# Patient Record
Sex: FEMALE | Race: BLACK OR AFRICAN AMERICAN | NOT HISPANIC OR LATINO | Employment: UNEMPLOYED | URBAN - METROPOLITAN AREA
[De-identification: names, ages, dates, MRNs, and addresses within clinical notes are randomized per-mention and may not be internally consistent; named-entity substitution may affect disease eponyms.]

---

## 2022-12-15 ENCOUNTER — HOSPITAL ENCOUNTER (EMERGENCY)
Facility: HOSPITAL | Age: 32
Discharge: HOME | End: 2022-12-16
Attending: EMERGENCY MEDICINE

## 2022-12-15 VITALS
WEIGHT: 207 LBS | RESPIRATION RATE: 18 BRPM | HEART RATE: 67 BPM | OXYGEN SATURATION: 100 % | DIASTOLIC BLOOD PRESSURE: 65 MMHG | BODY MASS INDEX: 32.49 KG/M2 | HEIGHT: 67 IN | TEMPERATURE: 97 F | SYSTOLIC BLOOD PRESSURE: 118 MMHG

## 2022-12-15 DIAGNOSIS — G43.909 MIGRAINE: Primary | ICD-10-CM

## 2022-12-15 RX ORDER — KETOROLAC TROMETHAMINE 30 MG/ML
30 INJECTION, SOLUTION INTRAMUSCULAR; INTRAVENOUS ONCE
Status: DISCONTINUED | OUTPATIENT
Start: 2022-12-16 | End: 2022-12-16

## 2022-12-15 RX ORDER — METOCLOPRAMIDE HYDROCHLORIDE 5 MG/ML
10 INJECTION INTRAMUSCULAR; INTRAVENOUS ONCE
Status: DISCONTINUED | OUTPATIENT
Start: 2022-12-16 | End: 2022-12-16

## 2022-12-15 RX ORDER — MAGNESIUM SULFATE HEPTAHYDRATE 40 MG/ML
2 INJECTION, SOLUTION INTRAVENOUS ONCE
Status: DISCONTINUED | OUTPATIENT
Start: 2022-12-16 | End: 2022-12-16

## 2022-12-15 RX ORDER — DIPHENHYDRAMINE HYDROCHLORIDE 50 MG/ML
25 INJECTION INTRAMUSCULAR; INTRAVENOUS ONCE
Status: DISCONTINUED | OUTPATIENT
Start: 2022-12-16 | End: 2022-12-16

## 2022-12-16 ENCOUNTER — APPOINTMENT (EMERGENCY)
Dept: RADIOLOGY | Facility: HOSPITAL | Age: 32
End: 2022-12-16

## 2022-12-16 LAB
ATRIAL RATE: 60 BPM
P AXIS: 54 DEGREES
PR INTERVAL: 148 MS
QRS AXIS: 86 DEGREES
QRSD INTERVAL: 86 MS
QT INTERVAL: 408 MS
QTC INTERVAL: 408 MS
T WAVE AXIS: 18 DEGREES
VENTRICULAR RATE: 60 BPM

## 2022-12-16 RX ORDER — ACETAMINOPHEN 325 MG/1
975 TABLET ORAL ONCE
Status: COMPLETED | OUTPATIENT
Start: 2022-12-16 | End: 2022-12-16

## 2022-12-16 RX ADMIN — ACETAMINOPHEN 975 MG: 325 TABLET ORAL at 00:05

## 2022-12-16 NOTE — ED NOTES
Patient refused IV, lab work and medication , " All I want is tylenol and a chest xray , I was at a hospital yesterday", MD Leti Damian made aware     Cary Blanco RN  12/16/22 7853

## 2022-12-16 NOTE — ED PROVIDER NOTES
History  Chief Complaint   Patient presents with   • Migraine     Patient complaint of " migraine and chest palpitations since 730pm "     29-year-old female, previous diagnosis of migraines otherwise healthy presents for evaluation of right-sided headache that started around 730 this evening while she was eating, gradually got worse, similar in quality to her previous migraines however more intense  No visual changes, no nausea, no vomiting, no photophobia  There is also associated central chest pain and palpitations which started around the same time  Similar symptoms in the past   No history of cardiac disease in self or family  Denies any drug or alcohol use does not smoke daily  No history of DVT or PE  She is not on any blood thinning medications  No medications taken prior to arrival            None       History reviewed  No pertinent past medical history  History reviewed  No pertinent surgical history  History reviewed  No pertinent family history  I have reviewed and agree with the history as documented  E-Cigarette/Vaping   • E-Cigarette Use Never User      E-Cigarette/Vaping Substances     Social History     Tobacco Use   • Smoking status: Never   • Smokeless tobacco: Never   Vaping Use   • Vaping Use: Never used   Substance Use Topics   • Alcohol use: Never   • Drug use: Never       Review of Systems   Constitutional: Negative for appetite change and fever  HENT: Negative for rhinorrhea and sore throat  Eyes: Negative for photophobia and visual disturbance  Respiratory: Negative for cough, chest tightness and wheezing  Cardiovascular: Positive for chest pain and palpitations  Negative for leg swelling  Gastrointestinal: Negative for abdominal distention, abdominal pain, blood in stool, constipation and diarrhea  Genitourinary: Negative for dysuria, flank pain, frequency, hematuria and urgency  Musculoskeletal: Negative for back pain  Skin: Negative for rash  Neurological: Positive for headaches  Negative for dizziness and weakness  All other systems reviewed and are negative  Physical Exam  Physical Exam  Vitals and nursing note reviewed  Constitutional:       Appearance: She is well-developed  She is not ill-appearing  HENT:      Head: Normocephalic and atraumatic  Eyes:      Pupils: Pupils are equal, round, and reactive to light  Cardiovascular:      Rate and Rhythm: Normal rate and regular rhythm  Heart sounds: No murmur heard  No friction rub  No gallop  Pulmonary:      Effort: Pulmonary effort is normal       Breath sounds: No wheezing or rales  Chest:      Chest wall: No tenderness  Abdominal:      General: There is no distension  Palpations: Abdomen is soft  There is no mass  Tenderness: There is no guarding or rebound  Musculoskeletal:      Cervical back: Normal range of motion and neck supple  Skin:     General: Skin is warm and dry  Neurological:      General: No focal deficit present  Mental Status: She is alert and oriented to person, place, and time  Mental status is at baseline  Cranial Nerves: No cranial nerve deficit  Motor: No weakness  Psychiatric:         Attention and Perception: She is attentive  She does not perceive visual hallucinations  Mood and Affect: Affect is flat  Behavior: Behavior is withdrawn  Thought Content: Thought content is not paranoid  Thought content does not include homicidal or suicidal ideation  Thought content does not include homicidal or suicidal plan           Vital Signs  ED Triage Vitals   Temperature Pulse Respirations Blood Pressure SpO2   12/15/22 2346 12/15/22 2346 12/15/22 2346 12/15/22 2346 12/15/22 2346   (!) 97 °F (36 1 °C) 67 18 118/65 100 %      Temp src Heart Rate Source Patient Position - Orthostatic VS BP Location FiO2 (%)   -- -- -- -- --             Pain Score       12/15/22 2355       No Pain           Vitals: 12/15/22 2346   BP: 118/65   Pulse: 67         Visual Acuity      ED Medications  Medications   acetaminophen (TYLENOL) tablet 975 mg (975 mg Oral Given 12/16/22 0005)       Diagnostic Studies  Results Reviewed     Procedure Component Value Units Date/Time    POCT pregnancy, urine [[id number]]     Lab Status: No result                  XR chest portable   ED Interpretation by Analia Ackerman DO (12/16 3150)   This study was ordered and independently reviewed by me    No acute findings noted                   Procedures  Procedures         ED Course  ED Course as of 12/16/22 0243   Fri Dec 16, 2022   0001 Patient refuses blood work, EKG unremarkable, does not want IV or IV medications, states Tylenol usually works for her headaches  Does agree to obtaining a chest x-ray setting of chest pain  She has a very flat affect, poor eye contact, and does not have any specific suicidal homicidal ideation   0022 Procedure Note: EKG  Date/Time: 12/16/22 12:22 AM   Performed by: Genet Gutierrez  Authorized by: Genet Gutierrez  Indications / Diagnosis: CP  ECG reviewed by me, the ED Provider: yes   The EKG demonstrates:  Rhythm: normal sinus  Intervals: normal intervals  Axis: normal axis  QRS/Blocks: normal QRS  ST Changes: No acute ST Changes, no STD/CHRIS  SBIRT 20yo+    Flowsheet Row Most Recent Value   SBIRT (25 yo +)    In order to provide better care to our patients, we are screening all of our patients for alcohol and drug use  Would it be okay to ask you these screening questions? Yes Filed at: 12/16/2022 0012   Initial Alcohol Screen: US AUDIT-C     1  How often do you have a drink containing alcohol? 0 Filed at: 12/16/2022 0012   2  How many drinks containing alcohol do you have on a typical day you are drinking? 0 Filed at: 12/16/2022 0012   3a  Male UNDER 65: How often do you have five or more drinks on one occasion? 0 Filed at: 12/16/2022 0012   3b  FEMALE Any Age, or MALE 65+:  How often do you have 4 or more drinks on one occassion? 0 Filed at: 12/16/2022 0012   Audit-C Score 0 Filed at: 12/16/2022 9702   NADIYA: How many times in the past year have you    Used an illegal drug or used a prescription medication for non-medical reasons? Never Filed at: 12/16/2022 0012                    MDM  Number of Diagnoses or Management Options  Migraine: established and worsening  Diagnosis management comments: 29-year-old female with headache, palpitations, no red flag symptoms       Amount and/or Complexity of Data Reviewed  Tests in the medicine section of CPT®: ordered and reviewed        Disposition  Final diagnoses:   Migraine     Time reflects when diagnosis was documented in both MDM as applicable and the Disposition within this note     Time User Action Codes Description Comment    12/16/2022 12:44 AM Maria Benoit [[id number]] Migraine       ED Disposition     ED Disposition   Discharge    Condition   Stable    Date/Time   Fri Dec 16, 2022 12:44 AM    Comment   Hansa Duong discharge to home/self care  Follow-up Information     Follow up With Specialties Details Why Contact Info Additional Information     [address] [facility] Emergency Medicine  If symptoms worsen [address] [place],[address] [id number]  [address] [facility], [address] [place] [address] [address]          There are no discharge medications for this patient  No discharge procedures on file      PDMP Review     None          ED Provider  Electronically Signed by           Mary Russell DO  12/16/22 7782

## 2023-02-18 ENCOUNTER — HOSPITAL ENCOUNTER (EMERGENCY)
Facility: HOSPITAL | Age: 33
Discharge: HOME | End: 2023-02-18
Attending: EMERGENCY MEDICINE

## 2023-02-18 VITALS
WEIGHT: 210 LBS | HEART RATE: 76 BPM | DIASTOLIC BLOOD PRESSURE: 64 MMHG | TEMPERATURE: 98 F | OXYGEN SATURATION: 98 % | SYSTOLIC BLOOD PRESSURE: 112 MMHG | RESPIRATION RATE: 18 BRPM

## 2023-02-18 DIAGNOSIS — R06.00 DYSPNEA: ICD-10-CM

## 2023-02-18 DIAGNOSIS — R00.2 PALPITATIONS: Primary | ICD-10-CM

## 2023-02-18 LAB — SARS-COV-2 RDRP RESP QL NAA+PROBE: NEGATIVE

## 2023-02-18 PROCEDURE — 93010 EKG 12-LEAD: ICD-10-PCS | Mod: ,,, | Performed by: INTERNAL MEDICINE

## 2023-02-18 PROCEDURE — 93010 ELECTROCARDIOGRAM REPORT: CPT | Mod: ,,, | Performed by: INTERNAL MEDICINE

## 2023-02-18 PROCEDURE — 93010 ELECTROCARDIOGRAM REPORT: CPT | Mod: 76,,, | Performed by: INTERNAL MEDICINE

## 2023-02-18 PROCEDURE — 99284 EMERGENCY DEPT VISIT MOD MDM: CPT | Mod: 25

## 2023-02-18 PROCEDURE — 93005 ELECTROCARDIOGRAM TRACING: CPT

## 2023-02-18 PROCEDURE — U0002 COVID-19 LAB TEST NON-CDC: HCPCS | Performed by: EMERGENCY MEDICINE

## 2023-02-18 PROCEDURE — 93010 EKG 12-LEAD: ICD-10-PCS | Mod: 76,,, | Performed by: INTERNAL MEDICINE

## 2023-02-18 NOTE — ED TRIAGE NOTES
Patient brought in by EMS. Was picked up from a gas station. Patient states she has been feeling heart palpitations with SOB x 1 day.     Review of patient's allergies indicates:  No Known Allergies     Patient has verified the spelling of their name and  on armband.   APPEARANCE: Patient is alert, calm, oriented x 4, and does not appear distressed.  SKIN: Skin is normal for race, warm, and dry. Normal skin turgor and mucous membranes moist.  CARDIAC: Normal rate and rhythm, no murmur heard. +intermittent chest palpitations  RESPIRATORY:Normal rate and effort. Breath sounds clear bilaterally throughout chest. Respirations are equal and unlabored.

## 2023-02-18 NOTE — ED NOTES
Care hand off from ROMINA Fried RN. Pt.is awake without distress. She has been in [place] for approx. 4 weeks. She traveled here from [place] with a friend and has been staying in homeless shelter since her friend left to go back to [place]. She plans to travel back home in the near future. She states she does not need blood work or further medical testing but is requesting covid testing to continue to stay in a shelter until she takes the train home. She is calm and cooperative. No distress.

## 2023-02-18 NOTE — ED NOTES
Patient changing into gown for xray. Patient is declining blood work. Also requesting Covid test to get into a homeless shelter. Will let MD know.

## 2023-02-18 NOTE — ED NOTES
Discussed discharge plan of care with pt.. she was provided with a list or community resources including homeless shelters at request. She was also directed to the [employer] bus stop across from hospital at request. The patient was given a copy of her negative Covid test with her discharge instructions at request.

## 2023-02-18 NOTE — ED PROVIDER NOTES
NAME:  Anahi Marte  CSN:     [id number]  MRN:    [id number]  ADMIT DATE: 2/18/2023        eMERGENCY dEPARTMENT eNCOUnter    CHIEF COMPLAINT    Chief Complaint   Patient presents with    Palpitations     Patient reports palpitations and shortness of breath for a couple days.  Symptoms are intermittent and are not associates with anything.  Patient denies chest pain.  RR regular and non labored.  Speaking full sentences.  Presents full bag of which she reports are her belongings and two bags of groceries.       HPI      Anahi Marte is a 32 y.o. female who presents to the ED for palpitations and shortness of breath ongoing unknown period of time.  Patient reports she is homeless and traveling here from [place].  She does not currently have a place to stay but will be.  Requesting a COVID test        ALLERGIES    Review of patient's allergies indicates:  No Known Allergies    PAST MEDICAL HISTORY  No past medical history on file.    SURGICAL HISTORY    No past surgical history on file.    SOCIAL HISTORY    Social History     Socioeconomic History    Marital status: Unknown       FAMILY HISTORY    No family history on file.    REVIEW OF SYSTEMS   ROS  All Systems otherwise negative except as noted in the History of Present Illness.        PHYSICAL EXAM    Reviewed Triage Note  VITAL SIGNS:   ED Triage Vitals [02/18/23 0143]   Enc Vitals Group      BP (!) 106/59      Pulse 75      Resp 16      Temp 97.8 °F (36.6 °C)      Temp src Oral      SpO2 98 %      Weight 210 lb      Height       Head Circumference       Peak Flow       Pain Score       Pain Loc       Pain Edu?       Excl. in GC?        Patient Vitals for the past 24 hrs:   BP Temp Temp src Pulse Resp SpO2 Weight   02/18/23 0656 112/64 97.8 °F (36.6 °C) Oral 76 18 98 % --   02/18/23 0143 (!) 106/59 97.8 °F (36.6 °C) Oral 75 16 98 % 95.3 kg (210 lb)           Physical Exam    Constitutional:  Well-developed, well-nourished. No acute distress  HENT:   Normocephalic, atraumatic.  Eyes:  EOMI. Conjunctiva normal without discharge.   Neck: Normal range of motion.No stridor. No meningismus.   Respiratory:  Normal breath sounds bilaterally.  No respiratory distress, retractions, or conversational dyspnea. No wheezing. No rhonchi. No rales.   Cardiovascular:  Normal heart rate. Normal rhythm. No pitting lower extremity edema.   GI:  Abdomen soft, non-distended, non-tender. Normal bowel sounds. No guarding, rigidity or rebound.    : No CVA tenderness.   Musculoskeletal:  No gross deformity or limited range of motion of all major joints. No palpable bony deformity. No tenderness to palpation.  Integument:  Warm and dry. No rash.  Neurologic:  Normal motor function. Normal sensory function. No focal deficits noted. Alert and Interactive.  Psychiatric:  Affect normal. Mood normal.         LABS  Pertinent labs reviewed. (See chart for details)   Labs Reviewed   SARS-COV-2 RNA AMPLIFICATION, QUAL         RADIOLOGY    Imaging Results              X-Ray Chest PA And Lateral (In process)                     PROCEDURES    Procedures      EKG     Interpreted by ERP:     EKG Readings: (Independently Interpreted)   Rhythm: Normal Sinus Rhythm. Heart Rate: 77. Ectopy: No Ectopy. Conduction: Normal. ST Segments: Normal ST Segments. T Waves: Normal. Clinical Impression: Normal Sinus Rhythm     ED COURSE & MEDICAL DECISION MAKING    Pertinent & Imaging studies reviewed. (See chart for details and specific orders.)        Medications - No data to display              DISPOSITION  Patient discharged in stable condition at No discharge date for patient encounter.      DISCHARGE INSTRUCTIONS & MEDS    @DISCHARGEMEDSLIST(<NOROUTINE> error)@      New Prescriptions    No medications on file           FINAL IMPRESSION    1. Palpitations    2. Dyspnea              Blood Pressure Follow-Up Advised  Patient advised to follow up with PCP within 3-5 days for blood pressure re-check if blood  pressure is equal to or greater than 120/80.         Critical care time spent with this patient (not including separately billable items) was  0 minutes.     DISCLAIMER: This note was prepared with Dragon NaturallySpeaking voice recognition transcription software. Garbled syntax, mangled pronouns, and other bizarre constructions may be attributed to that software system.      Osbaldo Tinsley MD  02/18/2023  7:49 AM           Osbaldo Tinsley MD  02/18/23 2293

## 2023-02-20 ENCOUNTER — HOSPITAL ENCOUNTER (EMERGENCY)
Facility: HOSPITAL | Age: 33
Discharge: HOME | End: 2023-02-20
Attending: EMERGENCY MEDICINE

## 2023-02-20 VITALS
OXYGEN SATURATION: 98 % | SYSTOLIC BLOOD PRESSURE: 112 MMHG | TEMPERATURE: 98 F | HEART RATE: 72 BPM | RESPIRATION RATE: 18 BRPM | DIASTOLIC BLOOD PRESSURE: 74 MMHG

## 2023-02-20 DIAGNOSIS — R07.9 CHEST PAIN: ICD-10-CM

## 2023-02-20 PROCEDURE — 99283 EMERGENCY DEPT VISIT LOW MDM: CPT

## 2023-02-20 PROCEDURE — 99283 EMERGENCY DEPT VISIT LOW MDM: CPT | Mod: ,,, | Performed by: EMERGENCY MEDICINE

## 2023-02-20 PROCEDURE — 25000003 PHARM REV CODE 250: Performed by: EMERGENCY MEDICINE

## 2023-02-20 PROCEDURE — 93010 EKG 12-LEAD: ICD-10-PCS | Mod: ,,, | Performed by: INTERNAL MEDICINE

## 2023-02-20 PROCEDURE — 93005 ELECTROCARDIOGRAM TRACING: CPT

## 2023-02-20 PROCEDURE — 99283 PR EMERGENCY DEPT VISIT,LEVEL III: ICD-10-PCS | Mod: ,,, | Performed by: EMERGENCY MEDICINE

## 2023-02-20 PROCEDURE — 93010 ELECTROCARDIOGRAM REPORT: CPT | Mod: ,,, | Performed by: INTERNAL MEDICINE

## 2023-02-20 RX ORDER — ACETAMINOPHEN 500 MG
1000 TABLET ORAL
Status: DISCONTINUED | OUTPATIENT
Start: 2023-02-20 | End: 2023-02-20 | Stop reason: HOSPADM

## 2023-02-20 RX ORDER — MAG HYDROX/ALUMINUM HYD/SIMETH 200-200-20
15 SUSPENSION, ORAL (FINAL DOSE FORM) ORAL
Status: DISCONTINUED | OUTPATIENT
Start: 2023-02-20 | End: 2023-02-20 | Stop reason: HOSPADM

## 2023-02-20 NOTE — ED PROVIDER NOTES
Encounter Date: 2/20/2023       History     Chief Complaint   Patient presents with    Chest Pain     Started earlier today. Denies any cardiac history.      Patient is a 32-year-old female past medical history of homelessness who presents with chest pain started earlier today.  Patient states she feels anxious. She denies fever, chills, cough, shortness of breath, nausea, diaphoresis. Pain does not radiate it is present in the central chest. She states she has had this before and been to the ED many times for it.  She reports she is not a smoker and denies illicit drug use. She is currently here from [place] and plans to go home.     I have treated this patient for the same before but patient seems registered under another name today.    The history is provided by the patient.   Review of patient's allergies indicates:  No Known Allergies  History reviewed. No pertinent past medical history.  History reviewed. No pertinent surgical history.  History reviewed. No pertinent family history.  Social History     Tobacco Use    Smoking status: Never     Passive exposure: Never    Smokeless tobacco: Never   Substance Use Topics    Drug use: Never         Physical Exam     Initial Vitals   BP Pulse Resp Temp SpO2   02/20/23 0002 02/20/23 0002 02/20/23 0002 02/20/23 0004 02/20/23 0002   112/74 72 18 98.1 °F (36.7 °C) 98 %      MAP       --                Physical Exam    Nursing note and vitals reviewed.  Constitutional: She appears well-developed and well-nourished. She is not diaphoretic. No distress.   HENT:   Head: Normocephalic and atraumatic.   Eyes: EOM are normal.   Neck: Neck supple.   Normal range of motion.  Cardiovascular:  Normal rate and regular rhythm.           Pulmonary/Chest: No respiratory distress.   Abdominal: Abdomen is soft.   Musculoskeletal:         General: Normal range of motion.      Cervical back: Normal range of motion and neck supple.     Neurological: She is alert and oriented to person, place,  and time. GCS score is 15. GCS eye subscore is 4. GCS verbal subscore is 5. GCS motor subscore is 6.   Skin: Skin is warm and dry.   Psychiatric:   Odd affect, also very flat       ED Course   Procedures  Labs Reviewed - No data to display  EKG Readings: (Independently Interpreted)   Initial Reading: No STEMI. Previous EKG: Compared with most recent EKG Previous EKG Date: 02/18/2023.   At 12:13 a.m. normal sinus rhythm rate of 82 normal axis normal intervals   ECG Results              EKG 12-lead (In process)  Result time 02/20/23 10:44:47      In process by Interface, Lab In [facility] (02/20/23 10:44:47)                   Narrative:    Test Reason : R07.9,    Vent. Rate : 082 BPM     Atrial Rate : 082 BPM     P-R Int : 196 ms          QRS Dur : 092 ms      QT Int : 368 ms       P-R-T Axes : 075 079 057 degrees     QTc Int : 429 ms    Normal sinus rhythm  Normal ECG  When compared with ECG of 18-FEB-2023 02:11,  No significant change was found    Referred By: AAAREFERR   SELF           Confirmed By:                                   Imaging Results    None          Medications - No data to display    Medical Decision Making:   History:   Old Medical Records: I decided to obtain old medical records.  Old Records Summarized: records from clinic visits.  Initial Assessment:   Patient refused all workup other than initial EKG  Initially requested tylenol for her chest pain  I discussed limitations to work up without imaging and blood work and pt reports understanding  Differential Diagnosis:   ACS, anxiety, pneumonia, pneumothorax, aortic dissection, PE  Independently Interpreted Test(s):   I have ordered and independently interpreted EKG Reading(s) - see prior notes  Clinical Tests:   Medical Tests: Ordered and Reviewed  ED Management:  Patient has presented to the ED multiple times for this and has had a normal workup she is refusing a workup today  She did request a Tylenol and refused it initially from the nurse  I  also discussed Maalox and she agreed to take that  She has no signs of ischemia on her EKG  As she does not want further testing, will discharge per her request                        Clinical Impression:   Final diagnoses:  [R07.9] Chest pain        ED Disposition Condition    Discharge Stable          ED Prescriptions    None       Follow-up Information       Follow up With Specialties Details Why Contact Info    [facility]    Call the referral line at [phone] to follow up with a primary doctor.    [facility]   Main Line: [phone] [address] [place] [id number]             Deepti Simons MD  02/20/23 3554

## 2023-02-20 NOTE — ED TRIAGE NOTES
Patient reports left side chest pain that started at 1730. Patient describes pain as throbbing rates pain 10/10. Patient denies n/v.

## 2023-02-20 NOTE — ED PROVIDER NOTES
Encounter Date: 2/20/2023       History     Chief Complaint   Patient presents with    Chest Pain     Started earlier today. Denies any cardiac history.      Patient is a 32-year-old female past medical history of homelessness who presents with chest pain started earlier today.  Patient states she feels anxious. She denies shortness of breath, nausea, injury, trauma. She has had this pain before and presented to this ED many times for the same symptoms however chart review is limited and she appears to have registered under a different name today.      The history is provided by the patient.   Review of patient's allergies indicates:  No Known Allergies  History reviewed. No pertinent past medical history.  History reviewed. No pertinent surgical history.  History reviewed. No pertinent family history.  Social History     Tobacco Use    Smoking status: Never     Passive exposure: Never    Smokeless tobacco: Never   Substance Use Topics    Drug use: Never     Review of Systems    Physical Exam     Initial Vitals   BP Pulse Resp Temp SpO2   02/20/23 0002 02/20/23 0002 02/20/23 0002 02/20/23 0004 02/20/23 0002   112/74 72 18 98.1 °F (36.7 °C) 98 %      MAP       --                Physical Exam    ED Course   Procedures  Labs Reviewed - No data to display  EKG Readings: (Independently Interpreted)   Initial Reading: No STEMI. Previous EKG: Compared with most recent EKG Previous EKG Date: 02/18/2023.   At 12:13 a.m. normal sinus rhythm rate of 82 normal axis normal intervals   ECG Results              EKG 12-lead (In process)  Result time 02/20/23 10:44:47      In process by Interface, Lab In [facility] (02/20/23 10:44:47)                   Narrative:    Test Reason : R07.9,    Vent. Rate : 082 BPM     Atrial Rate : 082 BPM     P-R Int : 196 ms          QRS Dur : 092 ms      QT Int : 368 ms       P-R-T Axes : 075 079 057 degrees     QTc Int : 429 ms    Normal sinus rhythm  Normal ECG  When compared with ECG of  18-FEB-2023 02:11,  No significant change was found    Referred By: AAAREFERR   SELF           Confirmed By:                                   Imaging Results    None          Medications - No data to display    Medical Decision Making:   Initial Assessment:   Patient refused all workup other than initial EKG  Differential Diagnosis:   ACS, anxiety, pneumonia, pneumothorax, aortic dissection, PE  Independently Interpreted Test(s):   I have ordered and independently interpreted EKG Reading(s) - see prior notes  Clinical Tests:   Medical Tests: Ordered and Reviewed  ED Management:  Patient has presented to the ED multiple times for this and has had a normal workup she is refusing a workup today  She did request a Tylenol and refused it initially from the nurse  I also discussed Maalox and she agreed to take that                        Clinical Impression:   Final diagnoses:  [R07.9] Chest pain        ED Disposition Condition    Discharge Stable          ED Prescriptions    None       Follow-up Information       Follow up With Specialties Details Why Contact Info    [facility]    Call the referral line at [phone] to follow up with a primary doctor.    [facility]   Main Line: [phone] [address] [place] [id number]

## 2023-02-21 PROCEDURE — 93010 ELECTROCARDIOGRAM REPORT: CPT | Mod: ,,, | Performed by: SPECIALIST

## 2023-02-21 PROCEDURE — 99283 EMERGENCY DEPT VISIT LOW MDM: CPT

## 2023-02-21 PROCEDURE — 93005 ELECTROCARDIOGRAM TRACING: CPT

## 2023-02-21 PROCEDURE — 93010 EKG 12-LEAD: ICD-10-PCS | Mod: ,,, | Performed by: SPECIALIST

## 2023-02-22 ENCOUNTER — HOSPITAL ENCOUNTER (EMERGENCY)
Facility: HOSPITAL | Age: 33
Discharge: HOME | End: 2023-02-22
Attending: EMERGENCY MEDICINE

## 2023-02-22 VITALS
SYSTOLIC BLOOD PRESSURE: 100 MMHG | HEART RATE: 69 BPM | WEIGHT: 210 LBS | RESPIRATION RATE: 18 BRPM | DIASTOLIC BLOOD PRESSURE: 55 MMHG | OXYGEN SATURATION: 98 % | TEMPERATURE: 98 F

## 2023-02-22 VITALS
RESPIRATION RATE: 16 BRPM | HEIGHT: 67 IN | BODY MASS INDEX: 33.74 KG/M2 | TEMPERATURE: 98 F | HEART RATE: 89 BPM | DIASTOLIC BLOOD PRESSURE: 73 MMHG | WEIGHT: 215 LBS | OXYGEN SATURATION: 98 % | SYSTOLIC BLOOD PRESSURE: 103 MMHG

## 2023-02-22 DIAGNOSIS — R07.9 CHEST PAIN: ICD-10-CM

## 2023-02-22 DIAGNOSIS — Z59.00 HOMELESS: Primary | ICD-10-CM

## 2023-02-22 PROCEDURE — 99283 EMERGENCY DEPT VISIT LOW MDM: CPT

## 2023-02-22 SDOH — SOCIAL DETERMINANTS OF HEALTH (SDOH): HOMELESSNESS UNSPECIFIED: Z59.00

## 2023-02-22 NOTE — ED NOTES
Anahi Marte presents to the ED with c/o  right sided chest pain that radiates to the left. No other symptoms.   [facility]ELI VSS  Verified patient's name and date of birth.

## 2023-02-22 NOTE — DISCHARGE INSTRUCTIONS
Call [employer] Transportation.    Why:  Outpatient Services: This transportation service can be utilized as needed for medical appointments. Please contact this service at least two days in advance for transportation.  Contact information:  [phone]           Call Community Information & Referral Line.    Why:  Outpatient Services: This service connects callers to information about critical health and human services available in their community. Please utilize this service as needed.  Contact information:  [phone]           Call [place].    Why:  Outpatient Services: This service may be utilized as needed for emergency services including: food, clothing, and shelter.  Contact information:  [phone]           Alcoholics Anonymous. Call today.    Why:  Outpatient Services: Please utilize this hotline as needed as part of your sober support network. Please attend 90 meetings in 90 days and obtain a sponsor.  Contact information:  [phone]           Go to [facility].    Why:  Outpatient Services: This pharmacy can be utilized when needed. Please bring your ID, prescriptions, and income/ household verification to your initial visit.  Contact information:  [address]  [address]  [place] [address]  Hours: Monday & Wednesday ~ 8:00 am- 10:00 am  [phone]           [facility]. Go on 11/30/2017.    Why:  Inpatient Substance Abuse Treatment: Please bring your ID, insurance card, current medications, personal belongings, and discharge paperwork to your appointment.  Contact information:  [address] [place] [address]  [phone]           Go to [facility].    Why:  Behavioral Health: Please contact Savana in the Intake Department to obtain your appointment date and time. Please bring your ID, insurance card, current medications, and discharge paperwork to your appointment.  Contact  information:  [address] [place] [address]  [phone]           [facility]. Go on 12/7/2017.    Why:  Primary Care: Your appointment is scheduled for December 7, 2017 at 1:00 pm. Please bring your ID, insurance card, current medications, and discharge paperwork to your appointment.  Contact information:  [address]  [place], [place] [id number] [phone]

## 2023-02-22 NOTE — ED PROVIDER NOTES
Encounter Date: 2/21/2023       History     Chief Complaint   Patient presents with    Chest Pain     Seen at multiple hospitals for same in past 2 days, Dx with palpitations an anxiety; pain started yesterday     [age] old female presents the emergency room because she is homeless and needs a place to relax for little bit.  She also would like to know where a [email or web address] bank is.  She states occasionally she gets palpitations.  She has no current chest pain.  She is been seen at [facility] few days ago then [facility] and now here.  She is working her way back  to [place] and would like to know where bus station has.  No abdominal pain syncope shortness of breath history of pulmonary embolism DVT.  She has no muscle cramps.  She is been eating and drinking just fine.    Review of patient's allergies indicates:  No Known Allergies  No past medical history on file.  No past surgical history on file.  No family history on file.  Social History     Tobacco Use    Smoking status: Never     Passive exposure: Never    Smokeless tobacco: Never   Substance Use Topics    Drug use: Never     Review of Systems   Constitutional:  Negative for fever.   HENT:  Negative for ear pain, rhinorrhea and sore throat.    Eyes:  Negative for pain and visual disturbance.   Respiratory:  Negative for cough and shortness of breath.    Cardiovascular:  Positive for palpitations. Negative for chest pain and leg swelling.   Gastrointestinal:  Negative for abdominal pain, diarrhea, nausea and vomiting.   Genitourinary:  Negative for difficulty urinating.   Musculoskeletal:  Negative for arthralgias.   Skin:  Negative for rash.   Neurological:  Negative for weakness, numbness and headaches.   Psychiatric/Behavioral:  Negative for agitation, confusion, hallucinations, sleep disturbance and suicidal ideas. The patient is not nervous/anxious.    All other systems reviewed and are negative.    Physical Exam     Initial Vitals [02/21/23 2208]   BP Pulse  Resp Temp SpO2   129/63 93 18 97.6 °F (36.4 °C) 98 %      MAP       --         Physical Exam    Nursing note and vitals reviewed.  Constitutional: She appears well-developed.  Non-toxic appearance.   HENT:   Head: Normocephalic and atraumatic.   Eyes: EOM are normal. Pupils are equal, round, and reactive to light.   Neck: Neck supple.   Cardiovascular:  Normal rate, regular rhythm, normal heart sounds and intact distal pulses.     Exam reveals no gallop and no friction rub.       No murmur heard.  Pulmonary/Chest: Breath sounds normal. No respiratory distress. She has no decreased breath sounds. She has no wheezes. She has no rhonchi. She has no rales.   Abdominal: Abdomen is soft. Bowel sounds are normal. She exhibits no distension. There is no abdominal tenderness.   Musculoskeletal:         General: Normal range of motion.      Cervical back: Neck supple.     Neurological: She is alert and oriented to person, place, and time. She has normal strength. No sensory deficit. GCS score is 15. GCS eye subscore is 4. GCS verbal subscore is 5. GCS motor subscore is 6.   Skin: Skin is warm and dry. No rash noted.   Psychiatric: She has a normal mood and affect.       ED Course   Procedures  Labs Reviewed - No data to display       Imaging Results    None          Medications - No data to display  Medical Decision Making:   Patient has normal EKG normal vital signs.  She does not want any blood work.  She is making rate a [place].  She is homeless and stop by the emergency room.  She needs no wearing a TM is and where the bus station is.  I will provide her with this information.  She states she is been seen at numerous emergency rooms over her lifetime palpitations.  She is no active palpitations here in the monitor and no signs of dysrhythmia.  Overall she appears well and stable for discharge.           ED Course as of 02/22/23 0144   Tue Feb 21, 2023 2225 EKG independently interpreted by me as rate 81 normal  sinus rate rhythm axis and intervals with no ST segment elevation or depression. [JS]   Wed Feb 22, 2023   0139 Patient would like to know where [facility] bank his.  She is homeless.  She has money she states but just needs an a TM.  She denies any current chest pain.  She states she is been emergency room numerous times for palpitations.  Her vital signs are stable.  EKG is completely normal with no signs of dysrhythmia.  She is been on the monitor with no significant palpitations.  She is no current chest pain shortness breath lungs are clear she is stable for discharge. [JS]      ED Course User Index  [JS] Chris Hearn MD                 Clinical Impression:   Final diagnoses:  [R07.9] Chest pain  [Z59.00] Homeless (Primary)        ED Disposition Condition    Discharge Stable          ED Prescriptions    None       Follow-up Information       Follow up With Specialties Details Why Contact [facility]  Schedule an appointment as soon as possible for a visit  As needed [address] [place] [address]  [phone]               Chris Hearn MD  02/22/23 0144

## 2023-02-23 ENCOUNTER — HOSPITAL ENCOUNTER (EMERGENCY)
Facility: HOSPITAL | Age: 33
Discharge: LEFT BEFORE DISCHARGE | End: 2023-02-23
Attending: EMERGENCY MEDICINE

## 2023-02-23 ENCOUNTER — PATIENT OUTREACH (OUTPATIENT)
Dept: EMERGENCY MEDICINE | Facility: HOSPITAL | Age: 33
End: 2023-02-23

## 2023-02-23 ENCOUNTER — HOSPITAL ENCOUNTER (EMERGENCY)
Facility: HOSPITAL | Age: 33
Discharge: HOME | End: 2023-02-24
Attending: EMERGENCY MEDICINE

## 2023-02-23 ENCOUNTER — HOSPITAL ENCOUNTER (EMERGENCY)
Facility: HOSPITAL | Age: 33
Discharge: HOME | End: 2023-02-23
Attending: STUDENT IN AN ORGANIZED HEALTH CARE EDUCATION/TRAINING PROGRAM

## 2023-02-23 VITALS
TEMPERATURE: 98 F | HEIGHT: 67 IN | RESPIRATION RATE: 16 BRPM | SYSTOLIC BLOOD PRESSURE: 107 MMHG | DIASTOLIC BLOOD PRESSURE: 74 MMHG | OXYGEN SATURATION: 98 % | WEIGHT: 210 LBS | BODY MASS INDEX: 32.96 KG/M2 | HEART RATE: 76 BPM

## 2023-02-23 VITALS
DIASTOLIC BLOOD PRESSURE: 75 MMHG | BODY MASS INDEX: 33.67 KG/M2 | TEMPERATURE: 98 F | RESPIRATION RATE: 14 BRPM | SYSTOLIC BLOOD PRESSURE: 111 MMHG | WEIGHT: 215 LBS | OXYGEN SATURATION: 99 % | HEART RATE: 80 BPM

## 2023-02-23 DIAGNOSIS — R07.9 CHEST PAIN: ICD-10-CM

## 2023-02-23 DIAGNOSIS — R06.02 SOB (SHORTNESS OF BREATH): ICD-10-CM

## 2023-02-23 DIAGNOSIS — K21.9 GASTROESOPHAGEAL REFLUX DISEASE, UNSPECIFIED WHETHER ESOPHAGITIS PRESENT: ICD-10-CM

## 2023-02-23 DIAGNOSIS — Z59.00 HOMELESSNESS: Primary | ICD-10-CM

## 2023-02-23 DIAGNOSIS — R07.9 CHEST PAIN: Primary | ICD-10-CM

## 2023-02-23 PROCEDURE — 99284 EMERGENCY DEPT VISIT MOD MDM: CPT | Mod: 25

## 2023-02-23 PROCEDURE — 99283 EMERGENCY DEPT VISIT LOW MDM: CPT | Mod: 25

## 2023-02-23 PROCEDURE — 93005 ELECTROCARDIOGRAM TRACING: CPT

## 2023-02-23 PROCEDURE — 93010 ELECTROCARDIOGRAM REPORT: CPT | Mod: ,,, | Performed by: SPECIALIST

## 2023-02-23 PROCEDURE — 93010 ELECTROCARDIOGRAM REPORT: CPT | Mod: 76,,, | Performed by: SPECIALIST

## 2023-02-23 PROCEDURE — 93005 ELECTROCARDIOGRAM TRACING: CPT | Performed by: SPECIALIST

## 2023-02-23 PROCEDURE — 93010 EKG 12-LEAD: ICD-10-PCS | Mod: ,,, | Performed by: SPECIALIST

## 2023-02-23 PROCEDURE — 93010 EKG 12-LEAD: ICD-10-PCS | Mod: 76,,, | Performed by: SPECIALIST

## 2023-02-23 RX ORDER — ACETAMINOPHEN 500 MG
1000 TABLET ORAL
Status: DISCONTINUED | OUTPATIENT
Start: 2023-02-23 | End: 2023-02-23 | Stop reason: HOSPADM

## 2023-02-23 SDOH — SOCIAL DETERMINANTS OF HEALTH (SDOH): HOMELESSNESS UNSPECIFIED: Z59.00

## 2023-02-23 NOTE — ED PROVIDER NOTES
Encounter Date: 2/23/2023       History     Chief Complaint   Patient presents with    Chest Pain     Since yesterday, while walking     32-year-old female with history of homelessness presents for evaluation of chest pain.  She presented to a nearby hospital earlier in the evening with a chief complaint of homelessness, seeking sleep.  On my exam, patient is also seeking shelter at this time due to the fact that she is traveling from [place] and can not get home at this time. She says she has had intermittent chest pain for many months, food and water make it worse, nothing makes it better.  She denies nausea or vomiting.  She reports normal bowel movements.  She denies recent alcohol use, recent ibuprofen use, history of liver disease.  She denies shortness of breath.  She denies cough, fevers or chills.  She reports no medical problems.  She takes no medications on a regular basis.    Review of patient's allergies indicates:  No Known Allergies  No past medical history on file.  No past surgical history on file.  No family history on file.  Social History     Tobacco Use    Smoking status: Never     Passive exposure: Never    Smokeless tobacco: Never   Substance Use Topics    Drug use: Never     Review of Systems   Constitutional:  Negative for activity change, appetite change, chills, fever and unexpected weight change.   HENT:  Negative for dental problem and drooling.    Eyes:  Negative for discharge and itching.   Respiratory:  Negative for cough, chest tightness, shortness of breath, wheezing and stridor.    Cardiovascular:  Positive for chest pain. Negative for palpitations and leg swelling.   Gastrointestinal:  Negative for abdominal distention, abdominal pain, diarrhea and nausea.   Genitourinary:  Negative for difficulty urinating, dysuria, frequency and urgency.   Musculoskeletal:  Negative for back pain, gait problem and joint swelling.   Neurological:  Negative for dizziness, syncope, numbness  and headaches.   Psychiatric/Behavioral:  Negative for agitation, behavioral problems and confusion.      Physical Exam     Initial Vitals [02/23/23 0458]   BP Pulse Resp Temp SpO2   107/74 76 16 98.3 °F (36.8 °C) 98 %      MAP       --         Physical Exam    Nursing note and vitals reviewed.  Constitutional: She appears well-developed and well-nourished. She is not diaphoretic.   HENT:   Head: Normocephalic and atraumatic.   Mouth/Throat: Oropharynx is clear and moist.   Eyes: EOM are normal. Pupils are equal, round, and reactive to light. Right eye exhibits no discharge. Left eye exhibits no discharge.   Neck: No tracheal deviation present.   Normal range of motion.  Cardiovascular:  Normal rate, regular rhythm and intact distal pulses.           Pulmonary/Chest: No respiratory distress. She has no wheezes. She exhibits no tenderness.   Abdominal: Abdomen is soft. She exhibits no distension. There is no abdominal tenderness.   Musculoskeletal:         General: No tenderness or edema. Normal range of motion.      Cervical back: Normal range of motion.     Neurological: She is alert and oriented to person, place, and time. She has normal strength. No cranial nerve deficit or sensory deficit. GCS eye subscore is 4. GCS verbal subscore is 5. GCS motor subscore is 6.   Skin: Skin is warm and dry. No rash noted.   Psychiatric: She has a normal mood and affect. Her behavior is normal. Thought content normal.       ED Course   Procedures  Labs Reviewed   POCT URINE PREGNANCY        ECG Results              EKG 12-lead (In process)  Result time 02/23/23 05:07:56      In process by Interface, Lab In [facility] (02/23/23 05:07:56)                   Narrative:    Test Reason : R07.9,    Vent. Rate : 077 BPM     Atrial Rate : 077 BPM     P-R Int : 186 ms          QRS Dur : 094 ms      QT Int : 386 ms       P-R-T Axes : 062 064 035 degrees     QTc Int : 436 ms    Normal sinus rhythm  Normal ECG  When compared with ECG of  23-FEB-2023 00:59,  No significant change was found    Referred By:             Confirmed By:                                   Imaging Results              X-Ray Chest AP Portable (Final result)  Result time 02/23/23 06:13:37      Final result by Leo Grady MD (02/23/23 06:13:37)                   Impression:      1. No acute radiographic abnormalities.      Electronically signed by: Leo Grady MD  Date:    02/23/2023  Time:    06:13               Narrative:    EXAMINATION:  XR CHEST AP PORTABLE    CLINICAL HISTORY:  Chest pain    COMPARISON:  February 18, 2023    FINDINGS:  AP view demonstrates no cardiac, pulmonary, or osseous abnormalities.                                       Medications   acetaminophen tablet 1,000 mg (1,000 mg Oral Not Given 2/23/23 0630)                 ED Course as of 02/23/23 0710   Thu Feb 23, 2023   0606 EKG with regular rate, regular rhythm, normal axis, no acute ST elevations or depressions, normal [place], QRS and QT interval. Interpreted by me.   [BS]   0606 I reviewed the CXR independently of the radiologist.     Chest x-ray was negative for acute cardiopulmonary abnormalities, infiltrates or bony abnormalities.     [BS]      ED Course User Index  [BS] Magdy Jones MD               32 year old patient with hx of homelessness presents secondary to post-prandial chest pain and evaluation for homelessness, she wants sleep. Vitals normal. Patient well appearing and non-toxic on my exam.     Chest pain less likely to be ACS given lack of risk factors, history less consistent with ACS, no ongoing chest pain. Patient is not volume overloaded on my exam, no pleural effusions on CXR, doubt CHF. Chest xray without infiltrate, patient has no fever and no cough, lessening concern for bacterial pneumonia. Breath sounds equal, respirations unlabored, CXR w/o evidence of pneumothorax. Patient well appearing with normal vital signs, no muffled heart tones, doubt tamponade. Doubt  pulmonary embolus, given patient is not tachycardic, lack of risk factors for PE, no unilateral leg swelling, PERC negative. Patient requesting tylenol, and states she's ready to go. Will discharge her with homelessness resources and advised her to return for further workup if the pain returns.    Clinical Impression:   Final diagnoses:  [R07.9] Chest pain (Primary)  [K21.9] Gastroesophageal reflux disease, unspecified whether esophagitis present        ED Disposition Condition    Discharge Stable          ED Prescriptions    None       Follow-up Information       Follow up With Specialties Details Why Contact [facility]  Schedule an appointment as soon as possible for a visit in 1 day to establish primary care physician [address]  [facility] [address]  [phone]               Magdy Jones MD  02/23/23 5635

## 2023-02-23 NOTE — ED PROVIDER NOTES
Encounter Date: 2/22/2023       History     Chief Complaint   Patient presents with    Shortness of Breath     Pt seen yesterday for same, states is having SOB. EMS reports going to multiple hospitals over the past few days.      31 yo F no pertinent past medical history presents today with homelessness.  Patient reports she is from [place] and trying to get back there.  Unfortunately her bank is [employer] and she can not get any money and therefore has no place to stay.  She reports she is living on the streets and she reports some shortness of breath that has been ongoing for some time and unchanged from prior.  She is requesting tylenol for some chest pain but mainly wants a place to sleep. She states she is been seen at other facilities for similar complaints but never had any medical problem or diagnosis.  Patient denies any history of pulmonary embolism, DVT, hematemesis, recent surgeries, birth control.       The history is provided by the patient. No  was used.   Review of patient's allergies indicates:  No Known Allergies  No past medical history on file.  No past surgical history on file.  No family history on file.  Social History     Tobacco Use    Smoking status: Never     Passive exposure: Never    Smokeless tobacco: Never   Substance Use Topics    Drug use: Never     Review of Systems   Constitutional:  Negative for activity change, diaphoresis and fever.   HENT:  Negative for ear pain, rhinorrhea, sore throat and trouble swallowing.    Eyes:  Negative for pain and visual disturbance.   Respiratory:  Positive for shortness of breath. Negative for cough and stridor.    Cardiovascular:  Positive for chest pain.   Gastrointestinal:  Negative for abdominal pain, blood in stool, diarrhea, nausea and vomiting.   Genitourinary:  Negative for dysuria, hematuria, vaginal bleeding and vaginal discharge.   Musculoskeletal:  Negative for gait problem.   Skin:  Negative for rash and wound.    Neurological:  Negative for seizures and headaches.   Psychiatric/Behavioral:  Negative for hallucinations and suicidal ideas.      Physical Exam     Initial Vitals [02/22/23 2206]   BP Pulse Resp Temp SpO2   103/73 89 16 98.4 °F (36.9 °C) 98 %      MAP       --         Physical Exam    Nursing note and vitals reviewed.  Constitutional: She is not diaphoretic. No distress.   Disheveled and foul smelling   HENT:   Head: Normocephalic and atraumatic.   Nose: Nose normal.   Eyes: EOM are normal. No scleral icterus.   Neck: Neck supple.   Normal range of motion.  Cardiovascular:  Normal rate, regular rhythm, normal heart sounds and intact distal pulses.     Exam reveals no gallop and no friction rub.       No murmur heard.  Pulmonary/Chest: Breath sounds normal. No stridor. No respiratory distress. She has no wheezes. She has no rhonchi. She has no rales.   Abdominal: Abdomen is soft. Bowel sounds are normal. She exhibits no distension. There is no abdominal tenderness. There is no rebound and no guarding.   Musculoskeletal:         General: Normal range of motion.      Cervical back: Normal range of motion and neck supple.     Neurological: She is alert and oriented to person, place, and time. She has normal strength. No cranial nerve deficit or sensory deficit.   Skin: Skin is warm and dry. Capillary refill takes less than 2 seconds. No rash noted.   Psychiatric: She has a normal mood and affect.       ED Course   Procedures  Labs Reviewed   SARS-COV-2 RNA AMPLIFICATION, QUAL     EKG Readings: (Independently Interpreted)   Initial Reading: No STEMI.   Normal sinus rhythm with sinus arrhythmia.  Rate of 71.  Normal intervals.  Normal axis.  No STEMI.     Imaging Results    None          Medications   acetaminophen tablet 1,000 mg (has no administration in time range)     Medical Decision Making:   ED Management:  Patient reports that she is homeless and just needs a place to sleep.  EMS offered her a ride to a  shelter but she declined.  She also states he is trying to find a PNC a TM goes that is how she can get money to get back to [place].  I attempted a medical workup on patient however she refused and only allowed an EKG and wanted Tylenol. Advised patient to stay for further workup and management.  Patient refuses to stay any longer.  Alert and oriented to person place and situation.  I explained the risks of worsening condition, death etc in layman's terms to the patient.  Patient voices these risks back to me.   Patient is not altered and is not under the influence.  Patient is welcome to return to the hospital at any time if he chooses to do so.  I will Discharge the patient AGAINST MEDICAL ADVICE with with further outpatient resources.  The AMA was witnessed by nurse.             ED Course as of 02/23/23 0258   u Feb 23, 2023   0113 Patient declining any other workup. [BD]      ED Course User Index  [BD] Epi Acevedo MD                 Clinical Impression:   Final diagnoses:  [R06.02] SOB (shortness of breath)  [Z59.00] Homelessness (Primary)        ED Disposition Condition    AMA Stable                Epi Acevedo MD  02/23/23 0259     Protopic Pregnancy And Lactation Text: This medication is Pregnancy Category C. It is unknown if this medication is excreted in breast milk when applied topically.

## 2023-02-23 NOTE — LETTER
Patient: Anahi Marte  YOB: 1990  Date: 2/23/2023 Time: 1:58 AM  Location: [facility]    Leaving the Hospital Against Medical Advice    Chart #:[id number]    This will certify that I, the undersigned,    ______________________________________________________________________    A patient in the above named medical center, having requested discharge and removal from the medical center against the advice of my attending physician(s), hereby release [facility], its physicians, officers and employees, severally and individually, from any and all liability of any nature whatsoever for any injury or harm or complication of any kind that may result directly or indirectly, by reason of my terminating my stay as a patient at [facility] and my departure from [facility], and hereby waive any and all rights of action I may now have or later acquire as a result of my voluntary departure from [facility] and the termination of my stay as a patient therein.    This release is made with the full knowledge of the danger that may result from the action which I am taking.      Date:_______________________                         ___________________________                                                                                    Patient/Legal Representative    Witness:        ____________________________                          ___________________________  Nurse                                                                        Physician

## 2023-02-23 NOTE — DISCHARGE INSTRUCTIONS

## 2023-02-24 PROCEDURE — 99283 EMERGENCY DEPT VISIT LOW MDM: CPT

## 2023-02-24 NOTE — ED PROVIDER NOTES
"Encounter Date: 2/23/2023       History     Chief Complaint   Patient presents with    Anxiety     Reports "anxious for a few days"      Emergent eval of a 32-year-old female with history of homelessness presents for evaluation of chest pain.  This is her 3rd visit today for CHest pain and homelessness. seeking sleep.  On my exam, patient is also seeking shelter at this time due to the fact that she is traveling from [place] and can not get home at this time. She says she has had intermittent chest pain for many months, food and water make it worse, nothing makes it better. Reports it is reproducable with Palpation  She denies nausea or vomiting.    She denies recent alcohol use, recent ibuprofen use, history of liver disease.  She denies shortness of breath, weakness or dizziness .  She denies cough, fevers or chills.  She reports no medical problems.  She takes no medications on a regular basis.    Review of patient's allergies indicates:  No Known Allergies  History reviewed. No pertinent past medical history.  History reviewed. No pertinent surgical history.  History reviewed. No pertinent family history.  Social History     Tobacco Use    Smoking status: Never     Passive exposure: Never    Smokeless tobacco: Never   Substance Use Topics    Drug use: Never     Review of Systems   Constitutional:  Negative for activity change, appetite change, chills, diaphoresis, fatigue and fever.   HENT:  Negative for congestion, postnasal drip, rhinorrhea and sore throat.    Respiratory:  Negative for cough, chest tightness, shortness of breath, wheezing and stridor.    Cardiovascular:  Positive for chest pain. Negative for palpitations and leg swelling.   Gastrointestinal:  Negative for abdominal distention, abdominal pain, nausea and vomiting.   Genitourinary:  Negative for flank pain and hematuria.   Musculoskeletal:  Negative for arthralgias, back pain, myalgias, neck pain and neck stiffness.   Skin:  Negative for " rash.   Neurological:  Negative for dizziness, syncope, weakness, light-headedness and headaches.   Hematological:  Does not bruise/bleed easily.   Psychiatric/Behavioral:  Negative for confusion. The patient is not nervous/anxious.    All other systems reviewed and are negative.    Physical Exam     Initial Vitals [02/23/23 2205]   BP Pulse Resp Temp SpO2   111/75 80 14 97.9 °F (36.6 °C) 99 %      MAP       --         Physical Exam    Nursing note and vitals reviewed.  Constitutional: She appears well-developed and well-nourished. She is not diaphoretic. No distress.   HENT:   Head: Normocephalic and atraumatic.   Right Ear: External ear normal.   Left Ear: External ear normal.   Nose: Nose normal.   Mouth/Throat: Oropharynx is clear and moist.   Eyes: Conjunctivae and EOM are normal. Pupils are equal, round, and reactive to light.   Neck: Neck supple. No tracheal deviation present.   Normal range of motion.  Cardiovascular:  Normal rate, regular rhythm, normal heart sounds and intact distal pulses.     Exam reveals no gallop and no friction rub.       No murmur heard.  Pulmonary/Chest: Breath sounds normal. No stridor. No respiratory distress. She has no wheezes. She has no rhonchi. She has no rales. She exhibits tenderness.   Abdominal: Abdomen is soft. Bowel sounds are normal. She exhibits no distension and no mass. There is no abdominal tenderness. There is no rebound and no guarding.   Musculoskeletal:         General: No edema. Normal range of motion.      Cervical back: Normal range of motion and neck supple.     Neurological: She is alert and oriented to person, place, and time. She has normal strength. No cranial nerve deficit or sensory deficit.   Skin: Skin is warm and dry. No rash noted. No erythema. No pallor.   Psychiatric: She has a normal mood and affect. Her behavior is normal. Judgment and thought content normal.       ED Course   Procedures  Labs Reviewed - No data to display  EKG Readings:  (Independently Interpreted)   Initial Reading: No STEMI. Rhythm: Sinus Arrhythmia. Heart Rate: 66. Ectopy: No Ectopy. Conduction: Normal.   Compare to 2 prior EKGs done today no changes     Imaging Results    None          Medications - No data to display  Medical Decision Making:   History:   Old Medical Records: I decided to obtain old medical records.  Old Records Summarized: records from another hospital and other records.       <> Summary of Records: Patient has had 2 other ER visits today both with the EKGs with no ischemic changes or ectopy or conduction delay no dysrhythmias.  Chest x-ray was done earlier today with no acute abnormality.  Patient also was seen multiple times yesterday for the same symptoms each time refusing cardiac workup or transfer to a homeless shelter  Independently Interpreted Test(s):   I have ordered and independently interpreted EKG Reading(s) - see prior notes  Clinical Tests:   Medical Tests: Ordered and Reviewed  ED Management:  32-year-old female who is homeless purchase any for chest pain.  She reports chest pain is present for months and reproducible no associated symptoms.  She is had 3 EKGs just today with no abnormalities chest x-ray done earlier today during 1 of her 3 visits.  That was normal.  Patient was offered cardiac evaluation with lab work and refused.  She understands the risks of not being fully evaluated for chest pain.  She refused evaluation earlier today as well.  I have given her information about [facility] [place] [facility] a [facility] for women and children I have called them but there line is busy.  Will give her the address.  I have also contacted [facility] who will assist in trying to find placement for this patient  MDM    Patient presents for emergent evaluation of acute chest pain, homelessness that poses a threat to life and/or bodily function.   Differential diagnosis includes but was not limited to ACS, MI, PE, bronchitis,  pneumonia, pleurisy, GERD, peptic ulcer disease.   In the ED patient found to have acute chest pain, homelessness.    I ordered labs and personally reviewed them.  Labs patient refused  I ordered X-rays and personally reviewed them and reviewed the radiologist interpretation.  Not require due to chest x-ray done earlier today which was normal  I ordered EKG and personally reviewed it.  EKG significant for see above      Discharge MDM        Patient is in no distress normal exam other than reproducible left-sided chest wall pain    Patient was discharged in stable condition.  Detailed return precautions discussed.  Patient was told to follow up with primary care physician or specialist based on their diagnosis  Talia Moise MD                          Clinical Impression:   Final diagnoses:  [R07.9] Chest pain  [Z59.00] Homelessness (Primary)        ED Disposition Condition    Discharge Stable          ED Prescriptions    None       Follow-up Information       Follow up With Specialties Details Why Contact Info Additional Information    [facility] - Emergency Dept Emergency Medicine Go to  If symptoms worsen [address] [facility] [id number]  [phone] 1st floor             Talia Moise MD  02/23/23 8283

## 2023-02-24 NOTE — DISCHARGE INSTRUCTIONS
[facility] [place], [place] - [facility] for women and children   [address] [place] [address]

## 2023-02-25 ENCOUNTER — HOSPITAL ENCOUNTER (EMERGENCY)
Facility: HOSPITAL | Age: 33
Discharge: LEFT BEFORE DISCHARGE | End: 2023-02-25
Attending: EMERGENCY MEDICINE

## 2023-02-25 VITALS
OXYGEN SATURATION: 100 % | DIASTOLIC BLOOD PRESSURE: 63 MMHG | RESPIRATION RATE: 16 BRPM | BODY MASS INDEX: 33.67 KG/M2 | SYSTOLIC BLOOD PRESSURE: 102 MMHG | HEART RATE: 80 BPM | WEIGHT: 215 LBS | TEMPERATURE: 97 F

## 2023-02-25 DIAGNOSIS — Z59.00 HOMELESSNESS: Primary | ICD-10-CM

## 2023-02-25 SDOH — SOCIAL DETERMINANTS OF HEALTH (SDOH): HOMELESSNESS UNSPECIFIED: Z59.00

## 2023-02-25 NOTE — ED NOTES
Pt appears paranoid and avoids eye contact. After extensive conversation the Pt still refused to give much information. Pt states they want to get back to [place], but do not want to contact family. Pt states they do not speak to their mother. Pt states they are down here seeing a friend and are trying to access a [facility] bank to get their disability money. Pt states they do not have any chest pain at this time and are not short of breath. Pt continually asking for discharge papers to leave.

## 2023-02-25 NOTE — ED PROVIDER NOTES
Encounter Date: 2/24/2023       History     Chief Complaint   Patient presents with    Shortness of Breath     31 yo F pw chest pain, dyspnea and homelessness. This is patient's 7th ER visit in 7 days for the same complaints. When questioned patient looks away and states she does not have any chest pain or sob. Pt states she is from [place] and is trying to get back but unable to get money from her bank. Upon further chart review she is using an alias and her other MRN is [id number] under the name Anahi Guerrreo. In that chart there are hundreds of ER visits for chest pain with negative workups. She also has diagnosis of schizophrenia that manifests primarily with recurrent pyschogenic episodes of chest pain/dyspnea hence the multiple ED visits for similar. She was followed at [facility] for psych/weekly counseling, with injectable antipsychotic due to issues with pill compliance. Still has poor insight into condition, recently sought second option of internist to confirm cardiac health and was ultimately recommended to avoid unnecessary cardiac workup    The history is provided by the patient. No  was used.   Review of patient's allergies indicates:  No Known Allergies  No past medical history on file.  No past surgical history on file.  No family history on file.  Social History     Tobacco Use    Smoking status: Never     Passive exposure: Never    Smokeless tobacco: Never   Substance Use Topics    Drug use: Never     Review of Systems    Physical Exam     Initial Vitals [02/24/23 2355]   BP Pulse Resp Temp SpO2   102/63 80 16 97 °F (36.1 °C) 100 %      MAP       --         Physical Exam    Nursing note and vitals reviewed.  Constitutional: She is not diaphoretic. No distress.   Disheveled and foul-smelling   HENT:   Head: Normocephalic and atraumatic.   Nose: Nose normal.   Eyes: EOM are normal. No scleral icterus.   Neck: Neck supple.   Normal range of motion.  Cardiovascular:  Normal rate,  regular rhythm, normal heart sounds and intact distal pulses.     Exam reveals no gallop and no friction rub.       No murmur heard.  Pulmonary/Chest: Breath sounds normal. No stridor. No respiratory distress. She has no wheezes. She has no rhonchi. She has no rales.   Abdominal: Abdomen is soft. Bowel sounds are normal. She exhibits no distension. There is no abdominal tenderness. There is no rebound and no guarding.   Musculoskeletal:         General: Normal range of motion.      Cervical back: Normal range of motion and neck supple.     Neurological: She is alert and oriented to person, place, and time. She has normal strength. No cranial nerve deficit or sensory deficit.   Skin: Skin is warm and dry. Capillary refill takes less than 2 seconds. No rash noted.   Psychiatric: She has a normal mood and affect.       ED Course   Procedures  Labs Reviewed - No data to display       Imaging Results    None          Medications - No data to display  Medical Decision Making:   ED Management:  Upon further chart review she is using an alias and her other MRN is [id number] under the name Anahi Guerrero. In that chart there are hundreds of ER visits for chest pain with negative workups. She also has diagnosis of schizophrenia that manifests primarily with recurrent pyschogenic episodes of chest pain/dyspnea hence the multiple ED visits for similar. She was followed at [facility] for psych/weekly counseling, with injectable antipsychotic due to issues with pill compliance. Still has poor insight into condition, recently sought second option of internist to confirm cardiac health and was ultimately recommended to avoid unnecessary cardiac workup    Pt eloped                        Clinical Impression:   Final diagnoses:  [Z59.00] Homelessness (Primary)        ED Disposition Condition    Eloped Stable                Epi Acevedo MD  02/25/23 0617

## 2023-03-02 ENCOUNTER — HOSPITAL ENCOUNTER (EMERGENCY)
Facility: HOSPITAL | Age: 33
Discharge: HOME | End: 2023-03-02
Attending: EMERGENCY MEDICINE

## 2023-03-02 VITALS
WEIGHT: 210 LBS | DIASTOLIC BLOOD PRESSURE: 63 MMHG | SYSTOLIC BLOOD PRESSURE: 114 MMHG | RESPIRATION RATE: 18 BRPM | OXYGEN SATURATION: 98 % | BODY MASS INDEX: 32.89 KG/M2 | TEMPERATURE: 98 F | HEART RATE: 75 BPM

## 2023-03-02 DIAGNOSIS — R07.9 CHEST PAIN, UNSPECIFIED TYPE: Primary | ICD-10-CM

## 2023-03-02 DIAGNOSIS — J06.9 VIRAL URI WITH COUGH: Primary | ICD-10-CM

## 2023-03-02 PROCEDURE — 63600175 PHARM REV CODE 636 W HCPCS: Performed by: EMERGENCY MEDICINE

## 2023-03-02 PROCEDURE — 99284 EMERGENCY DEPT VISIT MOD MDM: CPT

## 2023-03-02 PROCEDURE — 96372 THER/PROPH/DIAG INJ SC/IM: CPT | Performed by: EMERGENCY MEDICINE

## 2023-03-02 PROCEDURE — 25000003 PHARM REV CODE 250: Performed by: EMERGENCY MEDICINE

## 2023-03-02 RX ORDER — METOCLOPRAMIDE 10 MG/1
10 TABLET ORAL EVERY 6 HOURS
Qty: 30 TABLET | Refills: 0 | Status: SHIPPED | OUTPATIENT
Start: 2023-03-02

## 2023-03-02 RX ORDER — ONDANSETRON 4 MG/1
4 TABLET, ORALLY DISINTEGRATING ORAL
Status: COMPLETED | OUTPATIENT
Start: 2023-03-02 | End: 2023-03-02

## 2023-03-02 RX ORDER — ONDANSETRON 4 MG/1
4 TABLET, ORALLY DISINTEGRATING ORAL EVERY 6 HOURS PRN
Qty: 10 TABLET | Refills: 0 | Status: SHIPPED | OUTPATIENT
Start: 2023-03-02

## 2023-03-02 RX ORDER — METOCLOPRAMIDE HYDROCHLORIDE 5 MG/ML
10 INJECTION INTRAMUSCULAR; INTRAVENOUS
Status: COMPLETED | OUTPATIENT
Start: 2023-03-02 | End: 2023-03-02

## 2023-03-02 RX ORDER — NAPROXEN 500 MG/1
500 TABLET ORAL 2 TIMES DAILY WITH MEALS
Qty: 20 TABLET | Refills: 0 | Status: SHIPPED | OUTPATIENT
Start: 2023-03-02

## 2023-03-02 RX ORDER — PREDNISONE 10 MG/1
10 TABLET ORAL DAILY
Qty: 5 TABLET | Refills: 0 | Status: SHIPPED | OUTPATIENT
Start: 2023-03-02 | End: 2023-03-07

## 2023-03-02 RX ORDER — KETOROLAC TROMETHAMINE 30 MG/ML
15 INJECTION, SOLUTION INTRAMUSCULAR; INTRAVENOUS
Status: COMPLETED | OUTPATIENT
Start: 2023-03-02 | End: 2023-03-02

## 2023-03-02 RX ADMIN — ONDANSETRON 4 MG: 4 TABLET, ORALLY DISINTEGRATING ORAL at 04:03

## 2023-03-02 RX ADMIN — KETOROLAC TROMETHAMINE 15 MG: 30 INJECTION, SOLUTION INTRAMUSCULAR; INTRAVENOUS at 04:03

## 2023-03-02 RX ADMIN — METOCLOPRAMIDE 10 MG: 5 INJECTION, SOLUTION INTRAMUSCULAR; INTRAVENOUS at 04:03

## 2023-03-02 NOTE — ED NOTES
"Nurse went to give patient her medications ordered by the physician. Patient stated that "she only wants to take Tylenol" and does not want the other medications. Patient stated that she is leary about taking medications she has never taken before.  She also said if they came in pill form, she would take them with her when she is discharged, explained  that she cannot take the medication with her.  Patient refused all the medications that were ordered by ER physician. Patient is being discharged, but she wants to stay in the room until daylight.  Explained to the patient that she could wait in the waiting room until daylight.   "

## 2023-03-02 NOTE — ED PROVIDER NOTES
Encounter Date: 3/2/2023       History     Chief Complaint   Patient presents with    Headache    Cough     Pt c/o headache x 1 day and a dry cough that started on tonight. Pt denies any other complaint. nadn     This patient presents the emergency department with complaints of headache and cough.  Headache occurs when she coughs.  Patient denies any productivity to her cough and denies any fever.  Patient states symptoms been ongoing for now for 48 hours and just got tired of dealing with it.    The history is provided by the patient.   Review of patient's allergies indicates:  No Known Allergies  No past medical history on file.  No past surgical history on file.  No family history on file.  Social History     Tobacco Use    Smoking status: Never     Passive exposure: Never    Smokeless tobacco: Never   Substance Use Topics    Drug use: Never     Review of Systems   Constitutional: Negative.  Negative for fever.   HENT: Negative.     Eyes: Negative.    Respiratory:  Positive for cough.    Cardiovascular: Negative.    Gastrointestinal: Negative.    Endocrine: Negative.    Genitourinary: Negative.    Musculoskeletal: Negative.    Skin: Negative.    Allergic/Immunologic: Negative.    Neurological:  Positive for headaches.   Hematological: Negative.    Psychiatric/Behavioral: Negative.     All other systems reviewed and are negative.    Physical Exam     Initial Vitals [03/02/23 0407]   BP Pulse Resp Temp SpO2   114/63 75 18 98.4 °F (36.9 °C) 98 %      MAP       --         Physical Exam    Nursing note and vitals reviewed.  Constitutional: Vital signs are normal. She appears well-developed. She is active and cooperative.   HENT:   Head: Normocephalic and atraumatic.   Eyes: Conjunctivae, EOM and lids are normal. Pupils are equal, round, and reactive to light.   Neck: Trachea normal and phonation normal. Neck supple. No thyroid mass present.   Normal range of motion.   Full passive range of motion without pain.      Cardiovascular:  Normal rate, regular rhythm, S1 normal, S2 normal, normal heart sounds, intact distal pulses and normal pulses.           Pulmonary/Chest: Effort normal and breath sounds normal.   Abdominal: Abdomen is soft and flat. Bowel sounds are normal. There is no abdominal tenderness.   Musculoskeletal:         General: Normal range of motion.      Cervical back: Full passive range of motion without pain, normal range of motion and neck supple.     Lymphadenopathy:     She has no axillary adenopathy.   Neurological: She is alert and oriented to person, place, and time.   Skin: Skin is warm, dry and intact.   Psychiatric: She has a normal mood and affect. Her speech is normal and behavior is normal. Judgment and thought content normal. Cognition and memory are normal.       ED Course   Procedures  Labs Reviewed - No data to display       Imaging Results    None          Medications   ondansetron disintegrating tablet 4 mg (4 mg Oral Not Given 3/2/23 0422)   metoclopramide HCl injection 10 mg (10 mg Intramuscular Not Given 3/2/23 1318)   ketorolac injection 15 mg (15 mg Intramuscular Not Given 3/2/23 5553)     Medical Decision Making:   ED Management:  This patient presented to the emergency department with upper respiratory symptomatology.  The patient's differential included allergic, viral and bacterial etiologies.  Testing was done via swab to confirm presence or absence of treatable URI symptoms to include strep, influenza, and COVID-19 if necessarily ordered.  The patient was educated on there diagnoses and provided with medications to treat their illness.                         Clinical Impression:   Final diagnoses:  [J06.9] Viral URI with cough (Primary)        ED Disposition Condition    Discharge Stable          ED Prescriptions       Medication Sig Dispense Start Date End Date Auth. Provider    naproxen (NAPROSYN) 500 MG tablet Take 1 tablet (500 mg total) by mouth 2 (two) times daily with  meals. 20 tablet 3/2/2023 -- Jf Alvarez MD    metoclopramide HCl (REGLAN) 10 MG tablet Take 1 tablet (10 mg total) by mouth every 6 (six) hours. 30 tablet 3/2/2023 -- Jf Alvarez MD    predniSONE (DELTASONE) 10 MG tablet Take 1 tablet (10 mg total) by mouth once daily. for 5 days 5 tablet 3/2/2023 3/7/2023 Jf Alvarez MD    ondansetron (ZOFRAN-ODT) 4 MG TbDL Take 1 tablet (4 mg total) by mouth every 6 (six) hours as needed. 10 tablet 3/2/2023 -- Jf Alvarez MD          Follow-up Information       Follow up With Specialties Details Why Contact Info    Your PCP  Schedule an appointment as soon as possible for a visit in 1 week               Jf Alvarez MD  03/02/23 0643

## 2023-03-03 ENCOUNTER — HOSPITAL ENCOUNTER (EMERGENCY)
Facility: HOSPITAL | Age: 33
Discharge: HOME | End: 2023-03-03
Attending: EMERGENCY MEDICINE

## 2023-03-03 VITALS
SYSTOLIC BLOOD PRESSURE: 117 MMHG | OXYGEN SATURATION: 98 % | HEART RATE: 80 BPM | BODY MASS INDEX: 32.89 KG/M2 | TEMPERATURE: 98 F | RESPIRATION RATE: 18 BRPM | DIASTOLIC BLOOD PRESSURE: 54 MMHG | WEIGHT: 210 LBS

## 2023-03-03 DIAGNOSIS — R11.0 NAUSEA: Primary | ICD-10-CM

## 2023-03-03 PROCEDURE — 99282 EMERGENCY DEPT VISIT SF MDM: CPT

## 2023-03-03 NOTE — ED NOTES
Patient was seen yesterday morning in the ED for headache and refused all medications and treatment.  Patient returned to the ED this morning for same thing, looking for a place to stay until daylight.  Patient was discharged home.

## 2023-03-03 NOTE — ED PROVIDER NOTES
Encounter Date: 3/3/2023       History     Chief Complaint   Patient presents with    Abdominal Pain     C/o epigastric pain, seen in ed last pm for same, refused meds      Undomiciled 32-year-old female presents to the emergency room complaining of a brief episode of epigastric pain and nausea.  No chest pain.  Numerous recent ER visits under this MRN and another MRN.    The history is provided by the patient.   Review of patient's allergies indicates:  No Known Allergies  No past medical history on file.  No past surgical history on file.  No family history on file.  Social History     Tobacco Use    Smoking status: Never     Passive exposure: Never    Smokeless tobacco: Never   Substance Use Topics    Drug use: Never     Review of Systems   Respiratory: Negative.     Cardiovascular: Negative.    Gastrointestinal:  Positive for abdominal pain and nausea.     Physical Exam     Initial Vitals [03/03/23 0248]   BP Pulse Resp Temp SpO2   (!) 117/54 82 18 97.7 °F (36.5 °C) 98 %      MAP       --         Physical Exam    Nursing note and vitals reviewed.  Constitutional: She appears well-developed and well-nourished.   HENT:   Head: Normocephalic and atraumatic.   Eyes: EOM are normal.   Neck: Neck supple.   Normal range of motion.  Cardiovascular:  Normal rate, regular rhythm and normal heart sounds.     Exam reveals no gallop and no friction rub.       No murmur heard.  Pulmonary/Chest: Breath sounds normal. No respiratory distress. She has no wheezes. She has no rhonchi. She has no rales.   Abdominal: Abdomen is soft. She exhibits no distension. There is no abdominal tenderness. There is no rebound.   Musculoskeletal:         General: Normal range of motion.      Cervical back: Normal range of motion and neck supple.     Neurological: She is alert and oriented to person, place, and time.   Skin: Skin is warm and dry.   Psychiatric: Her behavior is normal. Judgment and thought content normal.   Flat affect       ED  Course   Procedures  Labs Reviewed - No data to display       Imaging Results    None          Medications - No data to display  Medical Decision Making:   History:   Old Medical Records: I decided to obtain old medical records.  Old Records Summarized: records from another hospital.           ED Course as of 03/03/23 0316   Fri Mar 03, 2023   0310 BP(!): 117/54 [EF]   0310 Temp: 97.7 °F (36.5 °C) [EF]   0310 Temp Source: Oral [EF]   0310 Pulse: 82 [EF]   0310 Resp: 18 [EF]   0310 SpO2: 98 % [EF]      ED Course User Index  [EF] Bro Hernandez MD                 Clinical Impression:   Final diagnoses:  [R11.0] Nausea (Primary)        ED Disposition Condition    Discharge Stable          ED Prescriptions    None       Follow-up Information    None         32-year-old female presents to the ER with an episode of upper abdominal discomfort and nausea.  Abdomen is benign in the ER, no reproducible tenderness.  Review of the medical record demonstrates [place] complaints.  Patient acknowledges that she currently does not have anywhere to live.  There is no evidence of any emergent life-threatening condition I see no reason for any labs at all.  She can be discharged from the emergency room.     Bro Hernandez MD  03/03/23 0326